# Patient Record
Sex: MALE | Race: OTHER | NOT HISPANIC OR LATINO | URBAN - METROPOLITAN AREA
[De-identification: names, ages, dates, MRNs, and addresses within clinical notes are randomized per-mention and may not be internally consistent; named-entity substitution may affect disease eponyms.]

---

## 2023-01-12 ENCOUNTER — EMERGENCY (EMERGENCY)
Facility: HOSPITAL | Age: 33
LOS: 1 days | Discharge: HOME | End: 2023-01-12
Attending: EMERGENCY MEDICINE | Admitting: EMERGENCY MEDICINE
Payer: SELF-PAY

## 2023-01-12 VITALS
DIASTOLIC BLOOD PRESSURE: 92 MMHG | RESPIRATION RATE: 20 BRPM | SYSTOLIC BLOOD PRESSURE: 142 MMHG | HEART RATE: 83 BPM | OXYGEN SATURATION: 92 %

## 2023-01-12 VITALS — OXYGEN SATURATION: 99 % | SYSTOLIC BLOOD PRESSURE: 138 MMHG | HEART RATE: 91 BPM | DIASTOLIC BLOOD PRESSURE: 77 MMHG

## 2023-01-12 LAB
ALBUMIN SERPL ELPH-MCNC: 5 G/DL — SIGNIFICANT CHANGE UP (ref 3.3–5)
ALP SERPL-CCNC: 95 U/L — SIGNIFICANT CHANGE UP (ref 40–120)
ALT FLD-CCNC: 30 U/L — SIGNIFICANT CHANGE UP (ref 10–45)
ANION GAP SERPL CALC-SCNC: 14 MMOL/L — SIGNIFICANT CHANGE UP (ref 5–17)
APAP SERPL-MCNC: <5 UG/ML — LOW (ref 10–30)
APTT BLD: 29.7 SEC — SIGNIFICANT CHANGE UP (ref 27.5–35.5)
AST SERPL-CCNC: 31 U/L — SIGNIFICANT CHANGE UP (ref 10–40)
BASE EXCESS BLDV CALC-SCNC: -1.7 MMOL/L — SIGNIFICANT CHANGE UP (ref -2–3)
BASOPHILS # BLD AUTO: 0.04 K/UL — SIGNIFICANT CHANGE UP (ref 0–0.2)
BASOPHILS NFR BLD AUTO: 0.3 % — SIGNIFICANT CHANGE UP (ref 0–2)
BILIRUB SERPL-MCNC: 0.4 MG/DL — SIGNIFICANT CHANGE UP (ref 0.2–1.2)
BUN SERPL-MCNC: 15 MG/DL — SIGNIFICANT CHANGE UP (ref 7–23)
CA-I SERPL-SCNC: 1.17 MMOL/L — SIGNIFICANT CHANGE UP (ref 1.15–1.33)
CALCIUM SERPL-MCNC: 9.4 MG/DL — SIGNIFICANT CHANGE UP (ref 8.4–10.5)
CHLORIDE SERPL-SCNC: 100 MMOL/L — SIGNIFICANT CHANGE UP (ref 96–108)
CO2 BLDV-SCNC: 25.7 MMOL/L — SIGNIFICANT CHANGE UP (ref 22–26)
CO2 SERPL-SCNC: 23 MMOL/L — SIGNIFICANT CHANGE UP (ref 22–31)
CREAT SERPL-MCNC: 0.94 MG/DL — SIGNIFICANT CHANGE UP (ref 0.5–1.3)
EGFR: 112 ML/MIN/1.73M2 — SIGNIFICANT CHANGE UP
EOSINOPHIL # BLD AUTO: 0.13 K/UL — SIGNIFICANT CHANGE UP (ref 0–0.5)
EOSINOPHIL NFR BLD AUTO: 1.1 % — SIGNIFICANT CHANGE UP (ref 0–6)
ETHANOL SERPL-MCNC: <10 MG/DL — SIGNIFICANT CHANGE UP (ref 0–10)
GAS PNL BLDV: 138 MMOL/L — SIGNIFICANT CHANGE UP (ref 136–145)
GAS PNL BLDV: SIGNIFICANT CHANGE UP
GLUCOSE SERPL-MCNC: 138 MG/DL — HIGH (ref 70–99)
HCO3 BLDV-SCNC: 24 MMOL/L — SIGNIFICANT CHANGE UP (ref 22–29)
HCT VFR BLD CALC: 41.6 % — SIGNIFICANT CHANGE UP (ref 39–50)
HGB BLD-MCNC: 14.2 G/DL — SIGNIFICANT CHANGE UP (ref 13–17)
IMM GRANULOCYTES NFR BLD AUTO: 1 % — HIGH (ref 0–0.9)
INR BLD: 1.07 — SIGNIFICANT CHANGE UP (ref 0.88–1.16)
LACTATE SERPL-SCNC: 1.3 MMOL/L — SIGNIFICANT CHANGE UP (ref 0.5–2)
LIDOCAIN IGE QN: 26 U/L — SIGNIFICANT CHANGE UP (ref 7–60)
LYMPHOCYTES # BLD AUTO: 29.6 % — SIGNIFICANT CHANGE UP (ref 13–44)
LYMPHOCYTES # BLD AUTO: 3.58 K/UL — HIGH (ref 1–3.3)
MAGNESIUM SERPL-MCNC: 2.2 MG/DL — SIGNIFICANT CHANGE UP (ref 1.6–2.6)
MCHC RBC-ENTMCNC: 29.3 PG — SIGNIFICANT CHANGE UP (ref 27–34)
MCHC RBC-ENTMCNC: 34.1 GM/DL — SIGNIFICANT CHANGE UP (ref 32–36)
MCV RBC AUTO: 86 FL — SIGNIFICANT CHANGE UP (ref 80–100)
MONOCYTES # BLD AUTO: 1 K/UL — HIGH (ref 0–0.9)
MONOCYTES NFR BLD AUTO: 8.3 % — SIGNIFICANT CHANGE UP (ref 2–14)
NEUTROPHILS # BLD AUTO: 7.22 K/UL — SIGNIFICANT CHANGE UP (ref 1.8–7.4)
NEUTROPHILS NFR BLD AUTO: 59.7 % — SIGNIFICANT CHANGE UP (ref 43–77)
NRBC # BLD: 0 /100 WBCS — SIGNIFICANT CHANGE UP (ref 0–0)
PCO2 BLDV: 45 MMHG — SIGNIFICANT CHANGE UP (ref 42–55)
PH BLDV: 7.34 — SIGNIFICANT CHANGE UP (ref 7.32–7.43)
PLATELET # BLD AUTO: 306 K/UL — SIGNIFICANT CHANGE UP (ref 150–400)
PO2 BLDV: 185 MMHG — HIGH (ref 25–45)
POTASSIUM BLDV-SCNC: 4.2 MMOL/L — SIGNIFICANT CHANGE UP (ref 3.5–5.1)
POTASSIUM SERPL-MCNC: 4.7 MMOL/L — SIGNIFICANT CHANGE UP (ref 3.5–5.3)
POTASSIUM SERPL-SCNC: 4.7 MMOL/L — SIGNIFICANT CHANGE UP (ref 3.5–5.3)
PROT SERPL-MCNC: 8.8 G/DL — HIGH (ref 6–8.3)
PROTHROM AB SERPL-ACNC: 12.7 SEC — SIGNIFICANT CHANGE UP (ref 10.5–13.4)
RBC # BLD: 4.84 M/UL — SIGNIFICANT CHANGE UP (ref 4.2–5.8)
RBC # FLD: 13.4 % — SIGNIFICANT CHANGE UP (ref 10.3–14.5)
SALICYLATES SERPL-MCNC: <0.3 MG/DL — LOW (ref 2.8–20)
SAO2 % BLDV: 99.8 % — HIGH (ref 67–88)
SARS-COV-2 RNA SPEC QL NAA+PROBE: SIGNIFICANT CHANGE UP
SODIUM SERPL-SCNC: 137 MMOL/L — SIGNIFICANT CHANGE UP (ref 135–145)
WBC # BLD: 12.09 K/UL — HIGH (ref 3.8–10.5)
WBC # FLD AUTO: 12.09 K/UL — HIGH (ref 3.8–10.5)

## 2023-01-12 PROCEDURE — 84295 ASSAY OF SERUM SODIUM: CPT

## 2023-01-12 PROCEDURE — 99053 MED SERV 10PM-8AM 24 HR FAC: CPT

## 2023-01-12 PROCEDURE — 99291 CRITICAL CARE FIRST HOUR: CPT

## 2023-01-12 PROCEDURE — 85610 PROTHROMBIN TIME: CPT

## 2023-01-12 PROCEDURE — 36415 COLL VENOUS BLD VENIPUNCTURE: CPT

## 2023-01-12 PROCEDURE — 70450 CT HEAD/BRAIN W/O DYE: CPT | Mod: 26,MA

## 2023-01-12 PROCEDURE — 83605 ASSAY OF LACTIC ACID: CPT

## 2023-01-12 PROCEDURE — 96374 THER/PROPH/DIAG INJ IV PUSH: CPT

## 2023-01-12 PROCEDURE — 70450 CT HEAD/BRAIN W/O DYE: CPT | Mod: MA

## 2023-01-12 PROCEDURE — 87635 SARS-COV-2 COVID-19 AMP PRB: CPT

## 2023-01-12 PROCEDURE — 71045 X-RAY EXAM CHEST 1 VIEW: CPT

## 2023-01-12 PROCEDURE — 80053 COMPREHEN METABOLIC PANEL: CPT

## 2023-01-12 PROCEDURE — 99291 CRITICAL CARE FIRST HOUR: CPT | Mod: 25

## 2023-01-12 PROCEDURE — 82330 ASSAY OF CALCIUM: CPT

## 2023-01-12 PROCEDURE — 71045 X-RAY EXAM CHEST 1 VIEW: CPT | Mod: 26

## 2023-01-12 PROCEDURE — 85730 THROMBOPLASTIN TIME PARTIAL: CPT

## 2023-01-12 PROCEDURE — 83735 ASSAY OF MAGNESIUM: CPT

## 2023-01-12 PROCEDURE — 83690 ASSAY OF LIPASE: CPT

## 2023-01-12 PROCEDURE — 84132 ASSAY OF SERUM POTASSIUM: CPT

## 2023-01-12 PROCEDURE — 82803 BLOOD GASES ANY COMBINATION: CPT

## 2023-01-12 PROCEDURE — 85025 COMPLETE CBC W/AUTO DIFF WBC: CPT

## 2023-01-12 PROCEDURE — 80307 DRUG TEST PRSMV CHEM ANLYZR: CPT

## 2023-01-12 RX ORDER — SODIUM CHLORIDE 9 MG/ML
1000 INJECTION INTRAMUSCULAR; INTRAVENOUS; SUBCUTANEOUS ONCE
Refills: 0 | Status: COMPLETED | OUTPATIENT
Start: 2023-01-12 | End: 2023-01-12

## 2023-01-12 RX ORDER — ONDANSETRON 8 MG/1
4 TABLET, FILM COATED ORAL ONCE
Refills: 0 | Status: COMPLETED | OUTPATIENT
Start: 2023-01-12 | End: 2023-01-12

## 2023-01-12 RX ADMIN — ONDANSETRON 4 MILLIGRAM(S): 8 TABLET, FILM COATED ORAL at 01:35

## 2023-01-12 RX ADMIN — SODIUM CHLORIDE 1000 MILLILITER(S): 9 INJECTION INTRAMUSCULAR; INTRAVENOUS; SUBCUTANEOUS at 01:45

## 2023-01-12 RX ADMIN — Medication 30 MILLILITER(S): at 04:59

## 2023-01-12 NOTE — ED PROVIDER NOTE - CLINICAL SUMMARY MEDICAL DECISION MAKING FREE TEXT BOX
unresponsive, per friend pt ingested GHD tonight, pt unable to provide hx. vomited upon ED arrival.   -check labs  -ekg  -ivf, zofran  -CT head to r/o m/s/b as cause of AMS  -cxr  AMS likely d/t drug overdose, nasal trumpet placed for airway protection

## 2023-01-12 NOTE — ED PROVIDER NOTE - PATIENT PORTAL LINK FT
You can access the FollowMyHealth Patient Portal offered by [facility] by registering at the following website: [email or web address]. By joining [facility]’s FollowMyHealth portal, you will also be able to view your health information using other applications (apps) compatible with our system.

## 2023-01-12 NOTE — ED ADULT TRIAGE NOTE - ARRIVAL INFO ADDITIONAL COMMENTS
Pt to ED for overdose. Found unresponsive. Per pt's friend at bedside patient drank a glass of GHD 30 min before ED arrival. Pt noted to be snoring at this time. Upgraded to MD Khan to Resuscitation room. Pt noted to have large Brown Emesis.

## 2023-01-12 NOTE — ED PROVIDER NOTE - OBJECTIVE STATEMENT
32M hx HIV, brought in by friend for overdose. friend states pt drank GHD 30 min prior to ED arrival. pt unable to provide hx. pt had large amount of nonbloody emesis upon ED arrival. crack pipe found amongst pt's belongings. 32M hx HIV, brought in by friend for overdose. friend states pt drank GHD 30 min prior to ED arrival. pt unable to provide hx. pt had large amount of nonbloody emesis upon ED arrival. drug pipe found amongst pt's belongings and discarded.

## 2023-01-12 NOTE — ED PROVIDER NOTE - PROGRESS NOTE DETAILS
pt aox3, fluent speech, steady gait, tolerating po. alerted to dangers of illicit drug use  I have discussed the discharge plan with the patient. The patient agrees with the plan, as discussed.  The patient understands Emergency Department diagnosis is a preliminary diagnosis often based on limited information and that the patient must adhere to the follow-up plan as discussed.  The patient understands that if the symptoms worsen the patient may return to the Emergency Department at any time for further evaluation and treatment.

## 2023-01-12 NOTE — ED ADULT NURSE NOTE - OBJECTIVE STATEMENT
pt arrived non-arousable. pt is snoring. Pt brought to resus. pt vomiting. pt snoring. pt withdraws from pain. pt placed on monitors. per triage rn, friend who brought patient in states that he was at a party and endorses drinking and drugs.

## 2023-01-12 NOTE — ED PROVIDER NOTE - NSFOLLOWUPINSTRUCTIONS_ED_ALL_ED_FT
Altered Mental Status    WHAT YOU NEED TO KNOW:    Altered mental status (AMS) is a disruption in how your brain works that causes a change in behavior. This change can happen suddenly or over days. AMS ranges from slight confusion to total disorientation and increased sleepiness to coma.     DISCHARGE INSTRUCTIONS:    Medicines:   •Antibiotics help fight or prevent infection. Take your antibiotics until they are gone, even if you feel better.     •Take your medicine as directed. Contact your healthcare provider if you think your medicine is not helping or if you have side effects. Tell your provider if you are allergic to any medicine. Keep a list of the medicines, vitamins, and herbs you take. Include the amounts, and when and why you take them. Bring the list or the pill bottles to follow-up visits. Carry your medicine list with you in case of an emergency.    Follow up with your doctor as directed: Write down your questions so you remember to ask them during your visits.    Contact your healthcare provider if:   •You have sudden changes in behavior.     •You are more sleepy or confused than usual.     •You have questions or concerns about your condition or care.     Seek care immediately or call 911 if:   •Your speech is slurred or you are rambling.     •You have a seizure.     •You are not able to move any part of your body freely.     •Someone close to you cannot wake you up.       Illegal Drug Use Information, Adult    Illegal drugs are chemicals and substances that are illegal to use, sell, or have (possess). Health care providers and pharmacies do not use or carry these types of drugs to treat medical problems because they can cause serious side effects and can lead to death. Examples of illegal drugs include:  •Cocaine or crack.    •Meth (methamphetamine) or crystal meth.    •"Bath salts" (synthetic cathinones).    •Heroin.    •LSD or acid.    •PCP (phencyclidine).    •Ecstasy.    What is drug dependence?    Using illegal drugs often leads to dependence or addiction. When you use certain drugs over a long period of time, your brain chemistry changes so that you can no longer function normally without that drug. This is called drug dependence. Drug dependence can cause you to:  •Have unpleasant feelings and physical problems when you stop using the drug (withdrawal).    •Be unable to perform at work or at home, or do the activities you used to do, without using the drug.    Some drugs make people feel so good that they want to use the drug again and again. This is called drug addiction. People who are addicted spend a lot of time seeking out the drug so that they can get the feeling they want from it. Addiction and dependence can be very hard to overcome.    How can illegal drug use and dependence affect me?    Using an illegal drug only once can have a major impact on your life. It is possible to die from side effects after using a drug just once. If you use an illegal drug repeatedly, you may need to take larger and larger doses of the drug to experience the feelings you want. Drug dependency and addiction may lead to:  •Being unable to care for yourself and others.    •Trouble with finances due to using your money for drugs.    •Withdrawal, if you stop using the drug.    •Negative effects on your relationships and work performance. It causes others not to trust you. If you have children, you could lose custody of them.    •Behavior problems, such as:   •Behaving in ways that do not match your values.     •Lying and crime, such as stealing.    •residential or [facility]. This can affect your ability to find a good job or continue your education.    •Health problems such as tooth loss, skin problems, heart and lung disease, and stomach problems.    •A drug overdose. This is a dangerous situation that requires hospitalization and often leads to death.    •If you are a woman, you may have problems with your pregnancy, including:  •Losing the pregnancy early (miscarriage), early delivery (premature birth), or delivering a lifeless infant (stillbirth).    •Slow or abnormal growth (birth defects) of your unborn child.    •Giving birth to a  who is addicted to illegal drugs.    What actions can I take to avoid illegal drug use?  Two people talking with a counselor.   To avoid using illegal drugs:  •Find healthy ways to cope with stress, such as exercise, meditation, or spending time with family and friends. Talk with your health care provider about how you feel and how to cope with stress.    •Spend time with people who do not use illegal drugs, or make new friends who do not use drugs.    •Do something else instead of using drugs. You can exercise, take up a hobby, or participate in activities that you can do with others.    •Do not be afraid to say no if someone offers you an illegal drug. Speak up about why you do not want to use drugs. You can be a positive role model for others.    •Work with a health care provider or counselor to create a program for yourself to help you deal with various aspects of drug use or addiction.    Where to find more information    You can find more information about illegal drug use, dependence, and addiction from:  •Your health care provider or mental health counselor.    •Narcotics Anonymous: www.na.org    •Substance Abuse and Mental Health Services Administration ([facility]):  •Treatment finder: https://www.[facility].gov/find-help    •National helpline: 2-200-719-HELP (9795)    Contact a health care provider if:    •You use illegal drugs and you want help to change your addictive behavior.    •You lose interest in things you used to enjoy, like hobbies or family activities.    •Your eating or sleeping habits change as a result of drug use.    •You use medicine to get the same effects as a drug. This is illegal use and can become addictive as well.    •You stopped illegal drug use previously and you start actively using it again (relapse).    Get help right away if:    •You have thoughts about hurting yourself or others.    If you ever feel like you may hurt yourself or others, or have thoughts about taking your own life, get help right away. Go to your nearest emergency department or:   • Call your local emergency services (360 in the [place]      • Call a suicide crisis helpline, such as the National Suicide Prevention Lifeline at 1-327.864.9860 or 061 in the [place] This is open 24 hours a day in the [place]     • Text the Crisis Text Line at 293753 (in the [place]     Summary    •Illegal drugs are chemicals and substances that are illegal to use, sell, or possess.    •Using illegal drugs often leads to dependence or addiction. Addiction and dependence can be very hard to overcome.    •If you use illegal drugs, look for resources to help you quit and find healthy ways to cope with stress.    This information is not intended to replace advice given to you by your health care provider. Make sure you discuss any questions you have with your health care provider.

## 2023-01-14 DIAGNOSIS — R41.82 ALTERED MENTAL STATUS, UNSPECIFIED: ICD-10-CM
